# Patient Record
Sex: FEMALE | Race: BLACK OR AFRICAN AMERICAN | HISPANIC OR LATINO | Employment: FULL TIME | URBAN - METROPOLITAN AREA
[De-identification: names, ages, dates, MRNs, and addresses within clinical notes are randomized per-mention and may not be internally consistent; named-entity substitution may affect disease eponyms.]

---

## 2018-05-14 ENCOUNTER — OFFICE VISIT (OUTPATIENT)
Dept: URGENT CARE | Facility: CLINIC | Age: 28
End: 2018-05-14
Payer: COMMERCIAL

## 2018-05-14 VITALS
SYSTOLIC BLOOD PRESSURE: 128 MMHG | BODY MASS INDEX: 44.68 KG/M2 | DIASTOLIC BLOOD PRESSURE: 64 MMHG | HEIGHT: 65 IN | OXYGEN SATURATION: 100 % | WEIGHT: 268.2 LBS | RESPIRATION RATE: 16 BRPM | TEMPERATURE: 98.5 F | HEART RATE: 100 BPM

## 2018-05-14 DIAGNOSIS — J06.9 ACUTE UPPER RESPIRATORY INFECTION: Primary | ICD-10-CM

## 2018-05-14 PROCEDURE — 99213 OFFICE O/P EST LOW 20 MIN: CPT | Performed by: PHYSICIAN ASSISTANT

## 2018-05-14 NOTE — PROGRESS NOTES
[facility]        NAME: Catia Chen is a 33 y o  female  : 1990    MRN: [id number]  DATE: May 14, 2018  TIME: 5:50 PM    Assessment and Plan   Acute upper respiratory infection [J06 9]  1  Acute upper respiratory infection           Patient Instructions   Acute Upper Respiratory Tract Infection:   -There is no sign of bacterial infection on exam at this time  This is likely a viral illness  The patient Advised supportive measures   -Fluticasone Nasal Spray for PND and congestion  -You can use OTC zyrtec or claritin  You can OTC mucinex  -Use a humidifier next to your bed  Take steam showers  -You can take Advil or Tylenol for fever or pain  -Stay very well hydrated and rest   -If your symptoms persist or worsen follow up immediately with your PCP    Follow up with PCP in 3-5 days  Proceed to  ER if symptoms worsen  Chief Complaint     Chief Complaint   Patient presents with    Sore Throat     Pt here ill x2 days, pt states she had temp 99 9,  cough,  sweating, headache  Pt used Dayquil  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, with flu and strep  History of Present Illness       The patient presents today for a two day hx of low grade fever of 99 9 degrees, cough, night sweats, headache  She states that her symptoms started with pharyngitis and chills and has progressed to fatigue  She feels that she has had minor improvement of her symptoms today  She states that she has been exposed to influenza and strep pharyngitis at work  She has been taking Dayquil with minimal relief  She did not have her flu vaccine  She denies recent travel  She denies dyspnea, wheezing, chest pain, palpitations, otalgia, neck stiffness  Review of Systems   Review of Systems   Constitutional: Positive for diaphoresis and fatigue  Negative for activity change, appetite change, chills and fever  HENT: Positive for postnasal drip, rhinorrhea and sore throat   Negative for congestion, drooling, ear discharge, ear pain, facial swelling, sinus pain, sinus pressure, tinnitus, trouble swallowing and voice change  Respiratory: Positive for cough  Negative for chest tightness, shortness of breath and wheezing  Cardiovascular: Negative for chest pain, palpitations and leg swelling  Gastrointestinal: Negative  Musculoskeletal: Negative  Skin: Negative  Neurological: Negative for dizziness, light-headedness and headaches  Hematological: Negative for adenopathy  Current Medications     No current outpatient prescriptions on file  Current Allergies     Allergies as of 2018    (No Known Allergies)            The following portions of the patient's history were reviewed and updated as appropriate: allergies, current medications, past family history, past medical history, past social history, past surgical history and problem list      History reviewed  No pertinent past medical history  Past Surgical History:   Procedure Laterality Date     SECTION         Family History   Problem Relation Age of Onset    Diabetes Mother     Heart disease Mother     Diabetes Father          Medications have been verified  Objective   /64 (BP Location: Right arm, Patient Position: Sitting, Cuff Size: Large)   Pulse 100   Temp 98 5 °F (36 9 °C) (Tympanic)   Resp 16   Ht 5' 5" (1 651 m)   Wt 122 kg (268 lb 3 2 oz)   SpO2 100%   BMI 44 63 kg/m²        Physical Exam     Physical Exam   Constitutional: She appears well-developed and well-nourished  No distress  HENT:   Right Ear: Hearing, tympanic membrane, external ear and ear canal normal    Left Ear: Hearing, tympanic membrane, external ear and ear canal normal    Nose: Nose normal  No mucosal edema or rhinorrhea  Right sinus exhibits no maxillary sinus tenderness and no frontal sinus tenderness  Left sinus exhibits no maxillary sinus tenderness and no frontal sinus tenderness     Mouth/Throat: Uvula is midline, oropharynx is clear and moist and mucous membranes are normal  No oropharyngeal exudate, posterior oropharyngeal edema or posterior oropharyngeal erythema  Neck: Normal range of motion  Neck supple  Cardiovascular: Normal rate, regular rhythm, S1 normal, S2 normal and normal heart sounds  Exam reveals no gallop and no friction rub  No murmur heard  Pulmonary/Chest: Effort normal and breath sounds normal  No respiratory distress  She has no decreased breath sounds  She has no wheezes  She has no rhonchi  She has no rales  Lymphadenopathy:     She has no cervical adenopathy  Skin: She is not diaphoretic  Nursing note and vitals reviewed

## 2018-05-14 NOTE — PATIENT INSTRUCTIONS
Upper Respiratory Infection   AMBULATORY CARE:   An upper respiratory infection  is also called a common cold  It can affect your nose, throat, ears, and sinuses  Common signs and symptoms include the following:  Cold symptoms are usually worst for the first 3 to 5 days  You may have any of the following:  · Runny or stuffy nose    · Sneezing and coughing    · Sore throat or hoarseness    · Red, watery, and sore eyes    · Fatigue     · Chills and fever    · Headache, body aches, or sore muscles  Seek care immediately if:   · You have chest pain or trouble breathing  Contact your healthcare provider if:   · You have a fever over 102ºF (39°C)  · Your sore throat gets worse or you see white or yellow spots in your throat  · Your symptoms get worse after 3 to 5 days or your cold is not better in 14 days  · You have a rash anywhere on your skin  · You have large, tender lumps in your neck  · You have thick, green or yellow drainage from your nose  · You cough up thick yellow, green, or bloody mucus  · You have vomiting for more than 24 hours and cannot keep fluids down  · You have a bad earache  · You have questions or concerns about your condition or care  Treatment for a cold: There is no cure for the common cold  Colds are caused by viruses and do not get better with antibiotics  Most people get better in 7 to 14 days  You may continue to cough for 2 to 3 weeks  The following may help decrease your symptoms:  · Decongestants  help reduce nasal congestion and help you breathe more easily  If you take decongestant pills, they may make you feel restless or not able to sleep  Do not use decongestant sprays for more than a few days  · Cough suppressants  help reduce coughing  Ask your healthcare provider which type of cough medicine is best for you  · NSAIDs , such as ibuprofen, help decrease swelling, pain, and fever   NSAIDs can cause stomach bleeding or kidney problems in certain people  If you take blood thinner medicine, always ask your healthcare provider if NSAIDs are safe for you  Always read the medicine label and follow directions  · Acetaminophen  decreases pain and fever  It is available without a doctor's order  Ask how much to take and how often to take it  Follow directions  Read the labels of all other medicines you are using to see if they also contain acetaminophen, or ask your doctor or pharmacist  Acetaminophen can cause liver damage if not taken correctly  Do not use more than 4 grams (4,000 milligrams) total of acetaminophen in one day  Manage your cold:   · Rest as much as possible  Slowly start to do more each day  · Drink more liquids as directed  Liquids will help thin and loosen mucus so you can cough it up  Liquids will also help prevent dehydration  Liquids that help prevent dehydration include water, fruit juice, and broth  Do not drink liquids that contain caffeine  Caffeine can increase your risk for dehydration  Ask your healthcare provider how much liquid to drink each day  · Soothe a sore throat  Gargle with warm salt water  This helps your sore throat feel better  Make salt water by dissolving ¼ teaspoon salt in 1 cup warm water  You may also suck on hard candy or throat lozenges  You may use a sore throat spray  · Use a humidifier or vaporizer  Use a cool mist humidifier or a vaporizer to increase air moisture in your home  This may make it easier for you to breathe and help decrease your cough  · Use saline nasal drops as directed  These help relieve congestion  · Apply petroleum-based jelly around the outside of your nostrils  This can decrease irritation from blowing your nose  · Do not smoke  Nicotine and other chemicals in cigarettes and cigars can make your symptoms worse  They can also cause infections such as bronchitis or pneumonia   Ask your healthcare provider for information if you currently smoke and need help to quit  E-cigarettes or smokeless tobacco still contain nicotine  Talk to your healthcare provider before you use these products  Prevent spreading your cold to others:   · Try to stay away from other people during the first 2 to 3 days of your cold when it is more easily spread  · Do not share food or drinks  · Do not share hand towels with household members  · Wash your hands often, especially after you blow your nose  Turn away from other people and cover your mouth and nose with a tissue when you sneeze or cough  Follow up with your healthcare provider as directed:  Write down your questions so you remember to ask them during your visits  © 2017 [facility] Information is for End User's use only and may not be sold, redistributed or otherwise used for commercial purposes  All illustrations and images included in CareNotes® are the copyrighted property of [employer]  or [address]  The above information is an  only  It is not intended as medical advice for individual conditions or treatments  Talk to your doctor, nurse or pharmacist before following any medical regimen to see if it is safe and effective for you  Acute Upper Respiratory Tract Infection:   -There is no sign of bacterial infection on exam at this time  This is likely a viral illness  Advised supportive measures   -Fluticasone Nasal Spray for PND and congestion  -You can use OTC zyrtec or claritin  You can OTC mucinex  -Use a humidifier next to your bed  Take steam showers     -You can take Advil or Tylenol for fever or pain  -Stay very well hydrated and rest   -If your symptoms persist or worsen follow up immediately with your PCP

## 2018-05-14 NOTE — LETTER
May 14, 2018     Patient: Yuli Dumas   YOB: 1990   Date of Visit: 5/14/2018       To Whom It May Concern: It is my medical opinion that Yuli Dumas will need to be excused from work on 5/13/2018  If you have any questions or concerns, please don't hesitate to call           Sincerely,        Casandra Sky PA-C    CC: No Recipients

## 2018-05-30 ENCOUNTER — OFFICE VISIT (OUTPATIENT)
Dept: URGENT CARE | Facility: CLINIC | Age: 28
End: 2018-05-30
Payer: COMMERCIAL

## 2018-05-30 VITALS
DIASTOLIC BLOOD PRESSURE: 54 MMHG | SYSTOLIC BLOOD PRESSURE: 116 MMHG | WEIGHT: 266 LBS | RESPIRATION RATE: 16 BRPM | OXYGEN SATURATION: 100 % | HEIGHT: 63 IN | BODY MASS INDEX: 47.13 KG/M2 | HEART RATE: 87 BPM | TEMPERATURE: 98.4 F

## 2018-05-30 DIAGNOSIS — J06.9 ACUTE URI: Primary | ICD-10-CM

## 2018-05-30 PROCEDURE — 99213 OFFICE O/P EST LOW 20 MIN: CPT | Performed by: FAMILY MEDICINE

## 2018-05-30 RX ORDER — BENZONATATE 200 MG/1
200 CAPSULE ORAL 3 TIMES DAILY PRN
Qty: 20 CAPSULE | Refills: 0 | Status: SHIPPED | OUTPATIENT
Start: 2018-05-30

## 2018-05-30 RX ORDER — AZITHROMYCIN 250 MG/1
TABLET, FILM COATED ORAL
Qty: 6 TABLET | Refills: 0 | Status: SHIPPED | OUTPATIENT
Start: 2018-05-30 | End: 2018-06-03

## 2018-05-30 NOTE — PATIENT INSTRUCTIONS
Acute URI w/ no improvement despite supportive treatment, therefore we will trial the patient on an antibiotic at this time  I have prescribed Zithromax x 5 days, to be completed as directed  I have also prescribed Tessalon pearls to help w/ the cough  If symptoms persist despite treatment or worsen, follow up w/ pcp for re-check

## 2018-05-30 NOTE — LETTER
May 30, 2018     Patient: Damon oJe   YOB: 1990   Date of Visit: 5/30/2018       To Whom it May Concern:    Damon Joe was seen in my clinic on 5/30/2018  If you have any questions or concerns, please don't hesitate to call           Sincerely,          Reagan Wolf MD

## 2018-05-30 NOTE — PROGRESS NOTES
[facility]        NAME: Breonna Artis is a 33 y o  female  : 1990    MRN: [id number]  DATE: May 30, 2018  TIME: 12:19 PM    Assessment and Plan   Acute URI [J06 9]  1  Acute URI  azithromycin (ZITHROMAX) 250 mg tablet    benzonatate (TESSALON) 200 MG capsule         Patient Instructions     Patient Instructions   Acute URI w/ no improvement despite supportive treatment, therefore we will trial the patient on an antibiotic at this time  I have prescribed Zithromax x 5 days, to be completed as directed  I have also prescribed Tessalon pearls to help w/ the cough  If symptoms persist despite treatment or worsen, follow up w/ pcp for re-check  Follow up with PCP in 5-7 days  Proceed to  ER if symptoms worsen  Chief Complaint     Chief Complaint   Patient presents with    Cold Like Symptoms         History of Present Illness       34 yo female presents c/o nasal congestion, thick green nasal drainage, PND, sore throat, and dry cough x 2 5 weeks  She was seen here on  and diagnosed w/ an acute URI, supportive treatment was advised  She states she has been taking DayQuil as needed but does not note any improvement  No fever/chills  No chest pain, SOB, or wheezing  Non-smoker  No headache or sinus pain/pressure  Review of Systems   Review of Systems   Constitutional: Negative  HENT:        As noted in HPI   Eyes: Negative  Respiratory:        As noted in HPI   Cardiovascular: Negative  Gastrointestinal: Negative  Musculoskeletal: Negative  Skin: Negative  Neurological: Negative            Current Medications       Current Outpatient Prescriptions:     azithromycin (ZITHROMAX) 250 mg tablet, Take 2 tablets today then 1 tablet daily x 4 days, Disp: 6 tablet, Rfl: 0    benzonatate (TESSALON) 200 MG capsule, Take 1 capsule (200 mg total) by mouth 3 (three) times a day as needed for cough, Disp: 20 capsule, Rfl: 0    Current Allergies     Allergies as of 2018  (No Known Allergies)            The following portions of the patient's history were reviewed and updated as appropriate: allergies, current medications, past family history, past medical history, past social history, past surgical history and problem list      History reviewed  No pertinent past medical history  Past Surgical History:   Procedure Laterality Date     SECTION         Family History   Problem Relation Age of Onset    Diabetes Mother     Heart disease Mother     Diabetes Father          Medications have been verified  Objective   /54   Pulse 87   Temp 98 4 °F (36 9 °C)   Resp 16   Ht 5' 3" (1 6 m)   Wt 121 kg (266 lb)   LMP 2018 (Approximate)   SpO2 100%   Breastfeeding? No   BMI 47 12 kg/m²        Physical Exam     Physical Exam   Constitutional: She is oriented to person, place, and time  Vital signs are normal  She appears well-developed and well-nourished  She is active and cooperative  Non-toxic appearance  She does not have a sickly appearance  She does not appear ill  No distress  HENT:   Head: Normocephalic and atraumatic  Right Ear: Tympanic membrane, external ear and ear canal normal    Left Ear: Tympanic membrane, external ear and ear canal normal    Nose: Nose normal  Right sinus exhibits no maxillary sinus tenderness and no frontal sinus tenderness  Left sinus exhibits no maxillary sinus tenderness and no frontal sinus tenderness  Mouth/Throat: Uvula is midline, oropharynx is clear and moist and mucous membranes are normal    Eyes: Conjunctivae and EOM are normal  Pupils are equal, round, and reactive to light  Neck: Normal range of motion  Neck supple  Cardiovascular: Normal rate, regular rhythm and normal heart sounds  Pulmonary/Chest: Effort normal and breath sounds normal    Lymphadenopathy:     She has no cervical adenopathy  Neurological: She is alert and oriented to person, place, and time     Skin: She is not diaphoretic  Psychiatric: She has a normal mood and affect  Her behavior is normal  Judgment and thought content normal    Nursing note and vitals reviewed

## 2019-02-04 ENCOUNTER — OFFICE VISIT (OUTPATIENT)
Dept: URGENT CARE | Facility: CLINIC | Age: 29
End: 2019-02-04
Payer: COMMERCIAL

## 2019-02-04 VITALS
TEMPERATURE: 99.2 F | OXYGEN SATURATION: 100 % | HEART RATE: 96 BPM | SYSTOLIC BLOOD PRESSURE: 108 MMHG | HEIGHT: 65 IN | WEIGHT: 261 LBS | BODY MASS INDEX: 43.49 KG/M2 | DIASTOLIC BLOOD PRESSURE: 64 MMHG | RESPIRATION RATE: 16 BRPM

## 2019-02-04 DIAGNOSIS — J11.1 INFLUENZA: Primary | ICD-10-CM

## 2019-02-04 PROCEDURE — 99213 OFFICE O/P EST LOW 20 MIN: CPT | Performed by: PHYSICIAN ASSISTANT

## 2019-02-04 NOTE — LETTER
February 4, 2019     Patient: Pearl Gannon   YOB: 1990   Date of Visit: 2/4/2019       To Whom It May Concern: It is my medical opinion that Pearl Gannon will need to be excused from work on 2/3/2019  If you have any questions or concerns, please don't hesitate to call           Sincerely,        Yasmani Saldana PA-C    CC: No Recipients

## 2019-02-05 NOTE — PATIENT INSTRUCTIONS
Influenza   AMBULATORY CARE:   Influenza  (the flu) is an infection caused by the influenza virus  The flu is easily spread when an infected person coughs, sneezes, or has close contact with others  You may be able to spread the flu to others for 1 week or longer after signs or symptoms appear  Common signs and symptoms include the following:   · Fever and chills    · Headaches, body aches, and muscle or joint pain    · Cough, runny nose, and sore throat    · Loss of appetite, nausea, vomiting, or diarrhea    · Tiredness    · Trouble breathing  Call 911 for any of the following:   · You have trouble breathing, and your lips look purple or blue  · You have a seizure  Seek care immediately if:   · You are dizzy, or you are urinating less or not at all  · You have a headache with a stiff neck, and you feel tired or confused  · You have new pain or pressure in your chest     · Your symptoms, such as shortness of breath, vomiting, or diarrhea, get worse  · Your symptoms, such as fever and coughing, seem to get better, but then get worse  Contact your healthcare provider if:   · You have new muscle pain or weakness  · You have questions or concerns about your condition or care  Treatment for influenza  may include any of the following:  · Acetaminophen  decreases pain and fever  It is available without a doctor's order  Ask how much to take and how often to take it  Follow directions  Acetaminophen can cause liver damage if not taken correctly  · NSAIDs , such as ibuprofen, help decrease swelling, pain, and fever  This medicine is available with or without a doctor's order  NSAIDs can cause stomach bleeding or kidney problems in certain people  If you take blood thinner medicine, always ask your healthcare provider if NSAIDs are safe for you  Always read the medicine label and follow directions  · Antivirals  help fight a viral infection    Manage your symptoms:   · Rest  as much as you can to help you recover  · Drink liquids as directed  to help prevent dehydration  Ask how much liquid to drink each day and which liquids are best for you  Prevent the spread of the flu:   · Wash your hands often  Use soap and water  Wash your hands after you use the bathroom, change a child's diapers, or sneeze  Wash your hands before you prepare or eat food  Use gel hand cleanser when soap and water are not available  Do not touch your eyes, nose, or mouth unless you have washed your hands first            · Cover your mouth when you sneeze or cough  Cough into a tissue or the bend of your arm  · Clean shared items with a germ-killing   Clean table surfaces, doorknobs, and light switches  Do not share towels, silverware, and dishes with people who are sick  Wash bed sheets, towels, silverware, and dishes with soap and water  · Wear a mask  over your mouth and nose if you are sick or are near anyone who is sick  · Stay away from others  if you are sick  · Influenza vaccine  helps prevent influenza (flu)  Everyone older than 6 months should get a yearly influenza vaccine  Get the vaccine as soon as it is available, usually in September or October each year  Follow up with your healthcare provider as directed:  Write down your questions so you remember to ask them during your visits  © 2017 [facility] Information is for End User's use only and may not be sold, redistributed or otherwise used for commercial purposes  All illustrations and images included in CareNotes® are the copyrighted property of [employer]  or [employer]  The above information is an  only  It is not intended as medical advice for individual conditions or treatments  Talk to your doctor, nurse or pharmacist before following any medical regimen to see if it is safe and effective for you  Influenza:   -The patients history and presentation are consistent with influenza  There is concern at the patient has been exposed by multiple contacts  The patient has not tolerated tamiflu in the past and feels that her symptoms have improved today, advised supportive measures at this time   -Stay very well hydrated and rest  It is important to push fluids  For the diarrhea you can follow the BRAT diet (Bananas, rice, applesauce and toast)   -You can take Advil or Tylenol for fever, body aches or pain  -You can use a humidifier next to your bed  Take steam showers     -If your symptoms persist or worsen despite these measures go immediately to the ED

## 2019-02-05 NOTE — PROGRESS NOTES
[address]        NAME: Kenn Munoz is a 33 y o  female  : 1990    MRN: [id number]  DATE: 2019  TIME: 8:51 AM    Assessment and Plan   Influenza [J11 1]  1  Influenza           Patient Instructions   Influenza:   -The patients history and presentation are consistent with influenza  There is concern at the patient has been exposed by multiple contacts  The patient has not tolerated tamiflu in the past and feels that her symptoms have improved today, advised supportive measures at this time   -Stay very well hydrated and rest  It is important to push fluids  For the diarrhea you can follow the BRAT diet (Bananas, rice, applesauce and toast)   -You can take Advil or Tylenol for fever, body aches or pain  -You can use a humidifier next to your bed  Take steam showers  -If your symptoms persist or worsen despite these measures go immediately to the ED     Follow up with PCP in 3-5 days  Proceed to  ER if symptoms worsen  Chief Complaint     Chief Complaint   Patient presents with    Fever     started yesterday highest 104    Diarrhea     started yesterday    Cough     started a couple days ago         History of Present Illness       The patient presents today for fever, diarrhea and cough that began approximately 2-3 days ago  She states that the diarrhea began yesterday and that yesterday she had a fever with tmax of 103 5 degrees  She states that the cough is non-productive  She has been taking Motrin and Nyquil with minimal relief  She had her flu vaccination 2018  She states that she was recently exposed to influenza at work  She denies chills, myalgias, dyspnea, wheezing, dizziness, weakness  She denies recent travel  She denies abdominal pain, melena, hematochezia  Review of Systems   Review of Systems   Constitutional: Positive for fatigue and fever  Negative for activity change, appetite change, chills and diaphoresis  HENT: Positive for congestion  Negative for ear discharge, ear pain, facial swelling, hearing loss, postnasal drip, rhinorrhea, sinus pain, sinus pressure, sneezing, sore throat, tinnitus, trouble swallowing and voice change  Respiratory: Positive for cough  Negative for chest tightness, shortness of breath, wheezing and stridor  Cardiovascular: Negative for chest pain, palpitations and leg swelling  Gastrointestinal: Positive for diarrhea  Negative for abdominal distention, abdominal pain, blood in stool, nausea and vomiting  Musculoskeletal: Negative for arthralgias, joint swelling, myalgias, neck pain and neck stiffness  Skin: Negative for rash  Allergic/Immunologic: Negative for immunocompromised state  Neurological: Negative for dizziness, weakness, light-headedness, numbness and headaches  Hematological: Negative for adenopathy  Current Medications       Current Outpatient Prescriptions:     benzonatate (TESSALON) 200 MG capsule, Take 1 capsule (200 mg total) by mouth 3 (three) times a day as needed for cough (Patient not taking: Reported on 2019 ), Disp: 20 capsule, Rfl: 0    Current Allergies     Allergies as of 2019    (No Known Allergies)            The following portions of the patient's history were reviewed and updated as appropriate: allergies, current medications, past family history, past medical history, past social history, past surgical history and problem list      Past Medical History:   Diagnosis Date    Anemia        Past Surgical History:   Procedure Laterality Date     SECTION       SECTION      no complications       Family History   Problem Relation Age of Onset    Diabetes Mother     Heart disease Mother     Diabetes Father          Medications have been verified          Objective   /64   Pulse 96   Temp 99 2 °F (37 3 °C)   Resp 16   Ht 5' 5" (1 651 m)   Wt 118 kg (261 lb)   LMP 2019 (Approximate)   SpO2 100%   BMI 43 43 kg/m² Physical Exam     Physical Exam   Constitutional: She is oriented to person, place, and time  She appears well-developed and well-nourished  She has a sickly appearance  No distress  HENT:   Head: Normocephalic and atraumatic  Right Ear: Hearing, tympanic membrane, external ear and ear canal normal    Left Ear: Hearing, tympanic membrane, external ear and ear canal normal    Nose: Mucosal edema and rhinorrhea present  Right sinus exhibits no maxillary sinus tenderness and no frontal sinus tenderness  Left sinus exhibits no maxillary sinus tenderness and no frontal sinus tenderness  Mouth/Throat: Uvula is midline, oropharynx is clear and moist and mucous membranes are normal  No uvula swelling  No oropharyngeal exudate, posterior oropharyngeal edema, posterior oropharyngeal erythema or tonsillar abscesses  Neck: Normal range of motion  Neck supple  Cardiovascular: Regular rhythm, S1 normal, S2 normal and normal heart sounds  Tachycardia present  Exam reveals no gallop, no S3, no S4, no distant heart sounds and no friction rub  No murmur heard  Pulmonary/Chest: No accessory muscle usage  No tachypnea and no bradypnea  No respiratory distress  She has decreased breath sounds  She has no wheezes  She has no rhonchi  She has no rales  Abdominal: Soft  Normal appearance and bowel sounds are normal  She exhibits no distension  There is no tenderness  There is no rigidity, no rebound, no guarding, no CVA tenderness, no tenderness at McBurney's point and negative Bowman's sign  No hernia  Lymphadenopathy:     She has no cervical adenopathy  Neurological: She is alert and oriented to person, place, and time  Skin: She is not diaphoretic  Psychiatric: She has a normal mood and affect  Her behavior is normal    Nursing note and vitals reviewed

## 2020-11-10 ENCOUNTER — VBI (OUTPATIENT)
Dept: ADMINISTRATIVE | Facility: OTHER | Age: 30
End: 2020-11-10

## 2020-12-08 ENCOUNTER — VBI (OUTPATIENT)
Dept: ADMINISTRATIVE | Facility: OTHER | Age: 30
End: 2020-12-08

## 2021-02-02 ENCOUNTER — VBI (OUTPATIENT)
Dept: ADMINISTRATIVE | Facility: OTHER | Age: 31
End: 2021-02-02